# Patient Record
Sex: MALE | Race: BLACK OR AFRICAN AMERICAN | NOT HISPANIC OR LATINO | URBAN - METROPOLITAN AREA
[De-identification: names, ages, dates, MRNs, and addresses within clinical notes are randomized per-mention and may not be internally consistent; named-entity substitution may affect disease eponyms.]

---

## 2023-05-31 ENCOUNTER — OFFICE (OUTPATIENT)
Dept: URBAN - METROPOLITAN AREA CLINIC 11 | Facility: CLINIC | Age: 43
End: 2023-05-31
Payer: COMMERCIAL

## 2023-05-31 ENCOUNTER — OFFICE (OUTPATIENT)
Dept: URBAN - METROPOLITAN AREA CLINIC 11 | Facility: CLINIC | Age: 43
End: 2023-05-31

## 2023-05-31 VITALS
HEART RATE: 72 BPM | WEIGHT: 208 LBS | DIASTOLIC BLOOD PRESSURE: 81 MMHG | OXYGEN SATURATION: 98 % | SYSTOLIC BLOOD PRESSURE: 126 MMHG

## 2023-05-31 DIAGNOSIS — K21.9 GASTRO-ESOPHAGEAL REFLUX DISEASE WITHOUT ESOPHAGITIS: ICD-10-CM

## 2023-05-31 DIAGNOSIS — R10.10 UPPER ABDOMINAL PAIN, UNSPECIFIED: ICD-10-CM

## 2023-05-31 DIAGNOSIS — R13.19 OTHER DYSPHAGIA: ICD-10-CM

## 2023-05-31 PROCEDURE — 99203 OFFICE O/P NEW LOW 30 MIN: CPT | Performed by: INTERNAL MEDICINE

## 2023-05-31 PROCEDURE — 99204 OFFICE O/P NEW MOD 45 MIN: CPT | Performed by: INTERNAL MEDICINE

## 2023-06-05 ENCOUNTER — OFFICE (OUTPATIENT)
Dept: URBAN - METROPOLITAN AREA PATHOLOGY 12 | Facility: PATHOLOGY | Age: 43
End: 2023-06-05

## 2023-06-05 ENCOUNTER — AMBULATORY SURGICAL CENTER (OUTPATIENT)
Dept: URBAN - METROPOLITAN AREA SURGERY 3 | Facility: SURGERY | Age: 43
End: 2023-06-05

## 2023-06-05 VITALS
TEMPERATURE: 97.6 F | DIASTOLIC BLOOD PRESSURE: 95 MMHG | SYSTOLIC BLOOD PRESSURE: 123 MMHG | RESPIRATION RATE: 20 BRPM | OXYGEN SATURATION: 96 % | OXYGEN SATURATION: 97 % | SYSTOLIC BLOOD PRESSURE: 118 MMHG | TEMPERATURE: 97.6 F | HEART RATE: 57 BPM | DIASTOLIC BLOOD PRESSURE: 69 MMHG | HEART RATE: 54 BPM | SYSTOLIC BLOOD PRESSURE: 141 MMHG | SYSTOLIC BLOOD PRESSURE: 117 MMHG | DIASTOLIC BLOOD PRESSURE: 75 MMHG | OXYGEN SATURATION: 97 % | SYSTOLIC BLOOD PRESSURE: 122 MMHG | RESPIRATION RATE: 16 BRPM | HEART RATE: 54 BPM | SYSTOLIC BLOOD PRESSURE: 141 MMHG | RESPIRATION RATE: 20 BRPM | DIASTOLIC BLOOD PRESSURE: 67 MMHG | DIASTOLIC BLOOD PRESSURE: 69 MMHG | HEART RATE: 61 BPM | SYSTOLIC BLOOD PRESSURE: 117 MMHG | RESPIRATION RATE: 20 BRPM | RESPIRATION RATE: 16 BRPM | RESPIRATION RATE: 13 BRPM | SYSTOLIC BLOOD PRESSURE: 118 MMHG | TEMPERATURE: 97.6 F | SYSTOLIC BLOOD PRESSURE: 118 MMHG | OXYGEN SATURATION: 96 % | WEIGHT: 208 LBS | HEART RATE: 61 BPM | WEIGHT: 208 LBS | DIASTOLIC BLOOD PRESSURE: 73 MMHG | TEMPERATURE: 97.3 F | DIASTOLIC BLOOD PRESSURE: 78 MMHG | SYSTOLIC BLOOD PRESSURE: 141 MMHG | DIASTOLIC BLOOD PRESSURE: 95 MMHG | DIASTOLIC BLOOD PRESSURE: 75 MMHG | WEIGHT: 208 LBS | DIASTOLIC BLOOD PRESSURE: 73 MMHG | HEART RATE: 57 BPM | SYSTOLIC BLOOD PRESSURE: 122 MMHG | DIASTOLIC BLOOD PRESSURE: 67 MMHG | TEMPERATURE: 97.3 F | DIASTOLIC BLOOD PRESSURE: 69 MMHG | SYSTOLIC BLOOD PRESSURE: 122 MMHG | HEART RATE: 65 BPM | SYSTOLIC BLOOD PRESSURE: 123 MMHG | OXYGEN SATURATION: 95 % | RESPIRATION RATE: 17 BRPM | DIASTOLIC BLOOD PRESSURE: 78 MMHG | SYSTOLIC BLOOD PRESSURE: 121 MMHG | RESPIRATION RATE: 17 BRPM | HEART RATE: 65 BPM | DIASTOLIC BLOOD PRESSURE: 78 MMHG | TEMPERATURE: 97.3 F | HEART RATE: 57 BPM | DIASTOLIC BLOOD PRESSURE: 95 MMHG | HEART RATE: 61 BPM | HEART RATE: 65 BPM | OXYGEN SATURATION: 97 % | RESPIRATION RATE: 21 BRPM | SYSTOLIC BLOOD PRESSURE: 123 MMHG | HEART RATE: 56 BPM | RESPIRATION RATE: 13 BRPM | RESPIRATION RATE: 16 BRPM | OXYGEN SATURATION: 96 % | RESPIRATION RATE: 13 BRPM | SYSTOLIC BLOOD PRESSURE: 121 MMHG | HEART RATE: 54 BPM | HEART RATE: 56 BPM | RESPIRATION RATE: 21 BRPM | RESPIRATION RATE: 17 BRPM | DIASTOLIC BLOOD PRESSURE: 67 MMHG | OXYGEN SATURATION: 95 % | HEART RATE: 56 BPM | OXYGEN SATURATION: 95 % | DIASTOLIC BLOOD PRESSURE: 73 MMHG | DIASTOLIC BLOOD PRESSURE: 75 MMHG | RESPIRATION RATE: 21 BRPM | SYSTOLIC BLOOD PRESSURE: 121 MMHG | SYSTOLIC BLOOD PRESSURE: 117 MMHG

## 2023-06-05 DIAGNOSIS — R10.13 EPIGASTRIC PAIN: ICD-10-CM

## 2023-06-05 DIAGNOSIS — K29.50 UNSPECIFIED CHRONIC GASTRITIS WITHOUT BLEEDING: ICD-10-CM

## 2023-06-05 DIAGNOSIS — K31.89 OTHER DISEASES OF STOMACH AND DUODENUM: ICD-10-CM

## 2023-06-05 DIAGNOSIS — B96.81 HELICOBACTER PYLORI [H. PYLORI] AS THE CAUSE OF DISEASES CLA: ICD-10-CM

## 2023-06-05 PROCEDURE — 88305 TISSUE EXAM BY PATHOLOGIST: CPT

## 2023-06-05 PROCEDURE — 43239 EGD BIOPSY SINGLE/MULTIPLE: CPT | Performed by: INTERNAL MEDICINE

## 2023-08-04 ENCOUNTER — OFFICE (OUTPATIENT)
Dept: URBAN - METROPOLITAN AREA CLINIC 11 | Facility: CLINIC | Age: 43
End: 2023-08-04

## 2023-08-04 VITALS
WEIGHT: 209 LBS | SYSTOLIC BLOOD PRESSURE: 130 MMHG | HEART RATE: 72 BPM | DIASTOLIC BLOOD PRESSURE: 91 MMHG | OXYGEN SATURATION: 97 %

## 2023-08-04 DIAGNOSIS — K21.9 GASTRO-ESOPHAGEAL REFLUX DISEASE WITHOUT ESOPHAGITIS: ICD-10-CM

## 2023-08-04 DIAGNOSIS — R10.10 UPPER ABDOMINAL PAIN, UNSPECIFIED: ICD-10-CM

## 2023-08-04 DIAGNOSIS — R19.4 CHANGE IN BOWEL HABIT: ICD-10-CM

## 2023-08-04 PROCEDURE — 99214 OFFICE O/P EST MOD 30 MIN: CPT | Performed by: NURSE PRACTITIONER

## 2023-08-04 RX ORDER — LACTOBACILLUS CASEI/FOLIC ACID 60-1.25 MG
CAPSULE ORAL
Qty: 30 | Refills: 0 | Status: COMPLETED
Start: 2023-08-04 | End: 2023-09-19

## 2023-08-04 RX ORDER — OMEPRAZOLE 40 MG/1
40 CAPSULE, DELAYED RELEASE ORAL
Status: COMPLETED
End: 2023-08-04

## 2023-08-04 RX ORDER — SUCRALFATE 1 G/10ML
SUSPENSION ORAL
Qty: 1200 | Refills: 3 | Status: COMPLETED
Start: 2023-08-04 | End: 2023-09-19

## 2023-08-04 RX ORDER — PANTOPRAZOLE 40 MG/1
40 TABLET, DELAYED RELEASE ORAL
Qty: 30 | Refills: 4 | Status: ACTIVE
Start: 2023-08-04

## 2023-08-04 NOTE — SERVICENOTES
we will try him on pantoprazole and sucralfate.   You likely still has a little gastric irritation.   We will try him on probiotics and a fiber supplement to regulate stools.   Will see him back in 6 weeks or sooner if needed

## 2023-08-04 NOTE — SERVICEHPINOTES
Mr. Campuzano is a 43-year-old male here for follow up of abdominal pain and GERD.   He was seen for abdominal pain and GERD and had an EGD on 06/05/2023 that showed gastritis.  Pathology was positive for H pylori.   He had a negative follow-up breath test on 07/31/2023. Today, Pain has improved but he has not been able to drink water for the past 3 months without having epigastric burning.  He can drink whiskey or other alcohol with no problem.   He denies any nausea or vomiting.  Since completing the antibiotics as bowel movements have been irregular.  They are formed but he may have several and 1 day then go a day or so without one.    br
brA detailed history from his first visit with Dr. Morgan can be seen below. The patient says for the past month he has had worsening abdominal pain.  He describes a dull persistent pain in the epigastrium and left upper abdomen.  It is worsened if he tries to drink water.  He previously tolerated water well but now he drinks this he was developed a stinging and burning type pain that is consistent with pyrosis.  He describes this as severe but also worsens the pain he feels in his epigastric and left upper quadrant area as well.  He also noted that alcohol and tomato sauce seem to have a similar effect on his symptoms by worsening them significantly.  He initially saw his PCP was started on sucralfate.  He did not get significant relief so he was started on omeprazole.  His it appears that his symptoms have improved some but not totally abated and still he will have significant symptoms if he drinks water.  Over this same time.  He has noted that he has had some increased stool frequency but denies watery stools or ardha diarrhea.  He denies any evidence of GI tract blood loss.  He denies any family history of inflammatory bowel disease, colon polyps, or colon cancer.  His primary care doctor checked a CBC and CMP as well as urinalysis and he says he was told these were without any abnormalities.  He subsequent a CT scan of his abdomen pelvis which I have reviewed and this was normal.  He was referred for further evaluation.

## 2023-09-19 ENCOUNTER — OFFICE (OUTPATIENT)
Dept: URBAN - METROPOLITAN AREA CLINIC 11 | Facility: CLINIC | Age: 43
End: 2023-09-19

## 2023-09-19 VITALS
HEIGHT: 72 IN | HEART RATE: 62 BPM | WEIGHT: 210 LBS | OXYGEN SATURATION: 98 % | SYSTOLIC BLOOD PRESSURE: 147 MMHG | DIASTOLIC BLOOD PRESSURE: 91 MMHG

## 2023-09-19 DIAGNOSIS — R10.10 UPPER ABDOMINAL PAIN, UNSPECIFIED: ICD-10-CM

## 2023-09-19 DIAGNOSIS — K21.9 GASTRO-ESOPHAGEAL REFLUX DISEASE WITHOUT ESOPHAGITIS: ICD-10-CM

## 2023-09-19 DIAGNOSIS — R19.4 CHANGE IN BOWEL HABIT: ICD-10-CM

## 2023-09-19 PROCEDURE — 99214 OFFICE O/P EST MOD 30 MIN: CPT | Performed by: NURSE PRACTITIONER

## 2023-09-19 RX ORDER — PANTOPRAZOLE 40 MG/1
40 TABLET, DELAYED RELEASE ORAL
Qty: 30 | Refills: 4 | Status: ACTIVE
Start: 2023-08-04

## 2023-09-19 NOTE — SERVICEHPINOTES
Mr. Campuzano is a 43-year-old male here for follow up of abdominal pain and GERD.  He was seen for abdominal pain and GERD and had an EGD on 06/05/2023 that showed gastritis.  Pathology was positive for H pylori.  He had a negative follow-up breath test on 07/31/2023. At his last visit, his pain had improved but he was not able to drink water without having epigastric burning for a few months.  He could drink whiskey or other alcohol with no problem. He was started on pantoprazole and sucralfate.  His bowel movements were a little irregular after completing the antibiotics for H pylori as well. In follow-up today,  he is doing well.   His epigastric burning after drinking water has improved  but not completely resolved.   He now has some epigastric discomfort with alcohol and spicy foods.  He was using Gaviscon but stopped after 2 weeks.  He is having 2-3 formed bowel movements mostly after eating daily.  prior to taking the antibiotics for the H pylori, he only had 1 bowel movement a day.   He denies any loose stool, diarrhea, blood in stool. br
kayce Melendez detailed history from his first visit with Dr. Morgan can be seen below. The patient says for the past month he has had worsening abdominal pain.  He describes a dull persistent pain in the epigastrium and left upper abdomen.  It is worsened if he tries to drink water.  He previously tolerated water well but now he drinks this he was developed a stinging and burning type pain that is consistent with pyrosis.  He describes this as severe but also worsens the pain he feels in his epigastric and left upper quadrant area as well.  He also noted that alcohol and tomato sauce seem to have a similar effect on his symptoms by worsening them significantly.  He initially saw his PCP was started on sucralfate.  He did not get significant relief so he was started on omeprazole.  His it appears that his symptoms have improved some but not totally abated and still he will have significant symptoms if he drinks water.  Over this same time.  He has noted that he has had some increased stool frequency but denies watery stools or radha diarrhea.  He denies any evidence of GI tract blood loss.  He denies any family history of inflammatory bowel disease, colon polyps, or colon cancer.  His primary care doctor checked a CBC and CMP as well as urinalysis and he says he was told these were without any abnormalities.  He subsequent a CT scan of his abdomen pelvis which I have reviewed and this was normal.  He was referred for further evaluation.

## 2023-09-19 NOTE — SERVICENOTES
Epigastric pain improving but still   Has occasional burning with water and now alcohol.   Will continue pantoprazole and he  will use Gaviscon daily for the next few weeks.   We will check amylase and lipase as well.   discussed avoiding fried, greasy, fatty, spicy foods and alcohol for the next few weeks.  Will see back in 3 months or sooner if needed

## 2023-09-20 LAB
AMYLASE: 35 U/L (ref 31–110)
LIPASE: 21 U/L (ref 13–78)